# Patient Record
Sex: MALE | Race: WHITE | URBAN - METROPOLITAN AREA
[De-identification: names, ages, dates, MRNs, and addresses within clinical notes are randomized per-mention and may not be internally consistent; named-entity substitution may affect disease eponyms.]

---

## 2019-08-30 ENCOUNTER — TRANSFERRED RECORDS (OUTPATIENT)
Dept: HEALTH INFORMATION MANAGEMENT | Facility: CLINIC | Age: 79
End: 2019-08-30

## 2019-08-30 ENCOUNTER — TELEPHONE (OUTPATIENT)
Dept: OPHTHALMOLOGY | Facility: CLINIC | Age: 79
End: 2019-08-30

## 2019-08-30 ENCOUNTER — OFFICE VISIT (OUTPATIENT)
Dept: OPHTHALMOLOGY | Facility: CLINIC | Age: 79
End: 2019-08-30
Attending: OPHTHALMOLOGY
Payer: COMMERCIAL

## 2019-08-30 DIAGNOSIS — H16.012 CENTRAL CORNEAL ULCER OF LEFT EYE: ICD-10-CM

## 2019-08-30 DIAGNOSIS — B00.52 HERPES KERATITIS: ICD-10-CM

## 2019-08-30 DIAGNOSIS — H16.002 ULCER OF LEFT CORNEA: Primary | ICD-10-CM

## 2019-08-30 PROBLEM — Z02.89 PAIN MEDICATION AGREEMENT: Status: ACTIVE | Noted: 2017-09-26

## 2019-08-30 PROBLEM — Z79.899 MEDICATION MANAGEMENT: Status: ACTIVE | Noted: 2018-05-25

## 2019-08-30 PROBLEM — L82.1 SEBORRHEIC KERATOSES: Status: ACTIVE | Noted: 2017-09-26

## 2019-08-30 PROBLEM — T82.599A FAILING VASCULAR BYPASS GRAFT: Status: ACTIVE | Noted: 2018-05-30

## 2019-08-30 PROBLEM — A41.9 SEPSIS (H): Status: ACTIVE | Noted: 2019-01-29

## 2019-08-30 PROBLEM — I42.9 CARDIOMYOPATHY (H): Status: ACTIVE | Noted: 2017-09-26

## 2019-08-30 PROBLEM — J96.11 CHRONIC RESPIRATORY FAILURE WITH HYPOXIA (H): Status: ACTIVE | Noted: 2017-09-26

## 2019-08-30 PROBLEM — Q72.90: Status: ACTIVE | Noted: 2017-09-26

## 2019-08-30 PROBLEM — M25.511 CHRONIC RIGHT SHOULDER PAIN: Status: ACTIVE | Noted: 2017-11-21

## 2019-08-30 PROBLEM — M75.101 ROTATOR CUFF TEAR ARTHROPATHY OF RIGHT SHOULDER: Status: ACTIVE | Noted: 2017-11-21

## 2019-08-30 PROBLEM — K21.9 GERD (GASTROESOPHAGEAL REFLUX DISEASE): Status: ACTIVE | Noted: 2017-09-26

## 2019-08-30 PROBLEM — Z86.711 PERSONAL HISTORY OF PULMONARY EMBOLISM: Status: ACTIVE | Noted: 2017-09-26

## 2019-08-30 PROBLEM — G64 DISORDER OF PERIPHERAL NERVOUS SYSTEM: Status: ACTIVE | Noted: 2017-09-26

## 2019-08-30 PROBLEM — M72.0 DUPUYTREN'S CONTRACTURE OF BOTH HANDS: Status: ACTIVE | Noted: 2017-09-26

## 2019-08-30 PROBLEM — E53.8 COBALAMIN DEFICIENCY: Status: ACTIVE | Noted: 2017-09-26

## 2019-08-30 PROBLEM — R91.1 COIN LESION: Status: ACTIVE | Noted: 2017-09-26

## 2019-08-30 PROBLEM — I73.9 PERIPHERAL VASCULAR DISEASE (H): Status: ACTIVE | Noted: 2017-09-26

## 2019-08-30 PROBLEM — H91.90 HEARING LOSS: Status: ACTIVE | Noted: 2017-09-26

## 2019-08-30 PROBLEM — G47.00 INSOMNIA: Status: ACTIVE | Noted: 2017-09-26

## 2019-08-30 PROBLEM — R91.1 NODULE OF LOWER LOBE OF RIGHT LUNG: Status: ACTIVE | Noted: 2019-02-01

## 2019-08-30 PROBLEM — E66.9 OBESITY: Status: ACTIVE | Noted: 2017-09-26

## 2019-08-30 PROBLEM — I50.20 SYSTOLIC HEART FAILURE (H): Status: ACTIVE | Noted: 2017-09-26

## 2019-08-30 PROBLEM — Z86.718 PERSONAL HISTORY OF VENOUS THROMBOSIS AND EMBOLISM: Status: ACTIVE | Noted: 2017-09-26

## 2019-08-30 PROBLEM — L30.9 ECZEMA: Status: ACTIVE | Noted: 2017-09-26

## 2019-08-30 PROBLEM — J18.9 PNEUMONIA: Status: ACTIVE | Noted: 2019-01-28

## 2019-08-30 PROBLEM — Z79.891 CHRONIC USE OF OPIATE DRUG FOR THERAPEUTIC PURPOSE: Status: ACTIVE | Noted: 2018-05-25

## 2019-08-30 PROBLEM — R04.0 RECURRENT EPISTAXIS: Status: ACTIVE | Noted: 2018-10-04

## 2019-08-30 PROBLEM — M72.2 PLANTAR FASCIITIS: Status: ACTIVE | Noted: 2017-09-29

## 2019-08-30 PROBLEM — Z99.81 DEPENDENCE ON SUPPLEMENTAL OXYGEN: Status: ACTIVE | Noted: 2017-09-26

## 2019-08-30 PROBLEM — F41.1 ANXIETY, GENERALIZED: Status: ACTIVE | Noted: 2017-09-26

## 2019-08-30 PROBLEM — L57.8 DERMATITIS DUE TO SUN: Status: ACTIVE | Noted: 2017-09-26

## 2019-08-30 PROBLEM — M17.9 DJD (DEGENERATIVE JOINT DISEASE) OF KNEE: Status: ACTIVE | Noted: 2017-09-26

## 2019-08-30 PROBLEM — G89.29 CHRONIC RIGHT SHOULDER PAIN: Status: ACTIVE | Noted: 2017-11-21

## 2019-08-30 PROBLEM — I25.2 HISTORY OF MYOCARDIAL INFARCTION: Status: ACTIVE | Noted: 2017-09-26

## 2019-08-30 PROBLEM — R60.9 EDEMA: Status: ACTIVE | Noted: 2017-09-26

## 2019-08-30 PROBLEM — R53.1 GENERALIZED WEAKNESS: Status: ACTIVE | Noted: 2017-09-26

## 2019-08-30 PROBLEM — E78.5 HYPERLIPIDEMIA: Status: ACTIVE | Noted: 2017-09-26

## 2019-08-30 PROBLEM — Z92.29 HISTORY OF ANTICOAGULANT THERAPY: Status: ACTIVE | Noted: 2017-09-26

## 2019-08-30 PROBLEM — I25.5 ISCHEMIC CARDIOMYOPATHY: Status: ACTIVE | Noted: 2017-01-25

## 2019-08-30 PROBLEM — M12.811 ROTATOR CUFF TEAR ARTHROPATHY OF RIGHT SHOULDER: Status: ACTIVE | Noted: 2017-11-21

## 2019-08-30 LAB
GRAM STN SPEC: ABNORMAL
KOH PREP SPEC: NORMAL
SPECIMEN SOURCE: ABNORMAL
SPECIMEN SOURCE: NORMAL

## 2019-08-30 PROCEDURE — 87102 FUNGUS ISOLATION CULTURE: CPT | Performed by: OPHTHALMOLOGY

## 2019-08-30 PROCEDURE — 87210 SMEAR WET MOUNT SALINE/INK: CPT | Performed by: OPHTHALMOLOGY

## 2019-08-30 PROCEDURE — 87205 SMEAR GRAM STAIN: CPT | Performed by: OPHTHALMOLOGY

## 2019-08-30 PROCEDURE — 87070 CULTURE OTHR SPECIMN AEROBIC: CPT | Performed by: OPHTHALMOLOGY

## 2019-08-30 PROCEDURE — G0463 HOSPITAL OUTPT CLINIC VISIT: HCPCS | Mod: ZF

## 2019-08-30 PROCEDURE — 87077 CULTURE AEROBIC IDENTIFY: CPT | Performed by: OPHTHALMOLOGY

## 2019-08-30 PROCEDURE — 87076 CULTURE ANAEROBE IDENT EACH: CPT | Performed by: OPHTHALMOLOGY

## 2019-08-30 PROCEDURE — 87186 SC STD MICRODIL/AGAR DIL: CPT | Performed by: OPHTHALMOLOGY

## 2019-08-30 PROCEDURE — 87075 CULTR BACTERIA EXCEPT BLOOD: CPT | Performed by: OPHTHALMOLOGY

## 2019-08-30 RX ORDER — OMEGA-3-ACID ETHYL ESTERS 1 G/1
1 CAPSULE, LIQUID FILLED ORAL DAILY
COMMUNITY
Start: 2018-09-11

## 2019-08-30 RX ORDER — SENNOSIDES 8.6 MG
1300 CAPSULE ORAL DAILY PRN
COMMUNITY
Start: 2018-05-22

## 2019-08-30 RX ORDER — ASCORBIC ACID 500 MG
500 TABLET ORAL DAILY
COMMUNITY

## 2019-08-30 RX ORDER — PRAMIPEXOLE DIHYDROCHLORIDE 0.12 MG/1
0.12 TABLET ORAL 2 TIMES DAILY
COMMUNITY
Start: 2018-10-11

## 2019-08-30 RX ORDER — DOXYCYCLINE HYCLATE 100 MG
100 TABLET ORAL 2 TIMES DAILY
Qty: 60 TABLET | Refills: 1 | Status: SHIPPED | OUTPATIENT
Start: 2019-08-30

## 2019-08-30 RX ORDER — VALACYCLOVIR HYDROCHLORIDE 1 G/1
1000 TABLET, FILM COATED ORAL 3 TIMES DAILY
COMMUNITY
Start: 2019-08-19

## 2019-08-30 RX ORDER — ZOLPIDEM TARTRATE 10 MG/1
10 TABLET ORAL AT BEDTIME
COMMUNITY
Start: 2019-04-23

## 2019-08-30 RX ORDER — WARFARIN SODIUM 10 MG/1
5 TABLET ORAL DAILY
COMMUNITY
Start: 2019-07-30

## 2019-08-30 RX ORDER — CARVEDILOL 3.12 MG/1
3.12 TABLET ORAL DAILY
COMMUNITY
Start: 2019-07-18

## 2019-08-30 RX ORDER — ALBUTEROL SULFATE 0.83 MG/ML
2.5 SOLUTION RESPIRATORY (INHALATION) DAILY
COMMUNITY

## 2019-08-30 RX ORDER — LORAZEPAM 1 MG/1
1 TABLET ORAL 3 TIMES DAILY
COMMUNITY
Start: 2019-06-25

## 2019-08-30 RX ORDER — MOXIFLOXACIN 5 MG/ML
1 SOLUTION/ DROPS OPHTHALMIC
COMMUNITY
Start: 2019-08-29 | End: 2019-09-05

## 2019-08-30 RX ORDER — EPINEPHRINE 0.3 MG/.3ML
0.3 INJECTION SUBCUTANEOUS PRN
COMMUNITY
Start: 2018-08-14

## 2019-08-30 RX ORDER — LANOLIN ALCOHOL/MO/W.PET/CERES
1000 CREAM (GRAM) TOPICAL DAILY
COMMUNITY

## 2019-08-30 RX ORDER — PRIMIDONE 250 MG/1
125 TABLET ORAL 3 TIMES DAILY
COMMUNITY
Start: 2018-10-11 | End: 2019-09-23

## 2019-08-30 RX ORDER — LEVOFLOXACIN 750 MG/1
750 TABLET, FILM COATED ORAL DAILY
COMMUNITY
Start: 2019-01-31

## 2019-08-30 RX ORDER — ARFORMOTEROL TARTRATE 15 UG/2ML
15 SOLUTION RESPIRATORY (INHALATION) 2 TIMES DAILY
COMMUNITY

## 2019-08-30 RX ORDER — ALBUTEROL SULFATE 90 UG/1
1 AEROSOL, METERED RESPIRATORY (INHALATION) DAILY PRN
COMMUNITY
Start: 2019-01-07

## 2019-08-30 RX ORDER — NITROGLYCERIN 0.4 MG/1
0.4 TABLET SUBLINGUAL PRN
COMMUNITY
Start: 2015-05-29

## 2019-08-30 RX ORDER — MIRTAZAPINE 45 MG/1
45 TABLET, FILM COATED ORAL DAILY
COMMUNITY
Start: 2019-08-20

## 2019-08-30 ASSESSMENT — TONOMETRY
IOP_METHOD: ICARE
OS_IOP_MMHG: 7
OD_IOP_MMHG: 11

## 2019-08-30 ASSESSMENT — VISUAL ACUITY
OD_SC: 20/20
OS_SC: 20/600
OS_PH_SC: 20/300
OD_SC+: -1
METHOD: SNELLEN - LINEAR

## 2019-08-30 ASSESSMENT — CONF VISUAL FIELD
OD_INFERIOR_TEMPORAL_RESTRICTION: 3
OS_NORMAL: 1
OD_INFERIOR_NASAL_RESTRICTION: 3

## 2019-08-30 ASSESSMENT — EXTERNAL EXAM - LEFT EYE: OS_EXAM: NORMAL

## 2019-08-30 ASSESSMENT — EXTERNAL EXAM - RIGHT EYE: OD_EXAM: NORMAL

## 2019-08-30 ASSESSMENT — SLIT LAMP EXAM - LIDS
COMMENTS: 1+ BLEPHARITIS
COMMENTS: 1+ BLEPHARITIS

## 2019-08-30 NOTE — NURSING NOTE
Chief Complaints and History of Present Illnesses   Patient presents with     Corneal Ulcer Evaluation     Chief Complaint(s) and History of Present Illness(es)     Corneal Ulcer Evaluation     Laterality: left eye    Associated symptoms: eye pain, redness, tearing, photophobia and headache    Treatments tried: eye drops    Pain scale: 7/10              Comments     Referred by Dr Sanchez for K ulcer/ HSV OS    Vision of the LE is stable, c/o constant blurriness + ocular pain (slightly managed with analgesics: tylenol)  Reports strict compliancy with tx plan.     Ocular meds: Vigamox q1hr while awake/q2hr while sleeping     Neema Davies COT 11:47 AM August 30, 2019

## 2019-08-30 NOTE — TELEPHONE ENCOUNTER
[facility] pharmacy compounds same day eye drops if needed after review with compounding pharmacy and [facility] pharmtato    [facility] pharmacy aware of prescriptions and will work on for same day dispensing today    Pt aware to go to [facility] pharmacy  Tramaine Ackerman RN RN 3:19 PM 08/30/19          [facility] Call Center    Phone Message    May a detailed message be left on voicemail: yes    Reason for Call: Medication Question or concern regarding medication   Prescription Clarification  Name of Medication: EYE DROP (SPECIALLY MADE) Pt unable to verify name of rx.   Prescribing Provider: ANDREA   Pharmacy: [facility] [place], [place] - [facility] [address]   What on the order needs clarification? Pt just got out of an appt with Andrea. They're at the pharmacy RIGHT NOW trying to  eye drops. Pharmacy is telling them that they won't have it ready until Wednesday. Pt is upset. Please call pt back.     Action Taken: Message routed to:  [facility]: eye and [facility] ([facility]): eye

## 2019-08-30 NOTE — TELEPHONE ENCOUNTER
Left eye gram stain today    Lab reporting few gram negative rods and many white blood cells, predominately PMN's    Pt on fortified antibiotics     Note to Dr. Collazo (on call also) for review  Tramaine Ackerman RN RN 4:41 PM 08/30/19          [facility] Call Center    Phone Message    May a detailed message be left on voicemail: yes    Reason for Call: Other: joceline from the lab is calling to report critical lab results, i called the back line, unable to reach anybody, please call joceline moise at [phone] thanks     Action Taken: Message routed to:  [facility] ([facility] eye

## 2019-08-30 NOTE — TELEPHONE ENCOUNTER
Lab calling   KOH/gram stain/and parasite ordered  Only able to get 2 of 3     Reviewed with Dr. Ochoa and note    Ok to cancel parasite    Note to Dr. Vale Ackerman, RN RN 4:11 PM 08/30/19

## 2019-08-30 NOTE — PROGRESS NOTES
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 Initial episode reportedly 6 years ago. Had been stable/quiescent for a few years up until Jan/Feb. Wife reports that since that time, has had almost weekly episodes of inflammation, eye redness, and pain. Has been following with Dr Sanchez at [facility]. Had repeat flare of keratitis beginning 6/5/19 which has progressed to corneal ulcer/epi defect with worsening infiltrate the past week. Initially managed with oral Valtrex (starting ~6 months ago) and Zirgan gel (had been using for ~3 months per patient/wife, stopped yesterday). On 8/29/19, epi defect was noted and he was started on vigamox q1 hour while awake, q2 hour overnight. Left eye feels worse today compared to yesterday but he reports improving vision subjectively.    On 8/26 appointment with Dr Sanchez, VA was 20/150, 20/600 today (ph 20/300). Ocular history otherwise notable for Cataract extraction/IOL both eyes.     No history of shingles vaccine. Not a contact lens wearer. Not diabetic, no history of autoimmune conditions.     POHx:  Last eye exam: yesterday at [facility] with Idris Sanchez  Prior eye surgery/laser: Cataract each eye   CTL wearer: No    Gtts:  Vigamox q1 hour left eye while awake, q2 hour overnight  Zirgan gel (discontinued yesterday)  Artificial tears q1-2 hours  Valtrex orally 1gm three times a day     All:  Niacin  Ketorolac    A/P:  1. Chronic herpetic keratitis, left eye  2. Central corneal ulcer, left eye  -herpetic keratitis left eye chronically for 6 years, progressive since Jan/Feb of this year per patient, and now with epi defect/ulceration x 1 week with worsening K infiltrate per Dr Sanchez  -stop Vigamox  -start fortified vancomycin and tobramycin drops q1 hour while awake, q2-3 hour overnight  -start PFATs q30-40 minutes, or more  freq if desired  -continue Valtrex 1gm three times a day   -start doxycyline 100mg twice a day - prescription sent  -Corneal scraping/culture sent today for gram stain/aerobic/anaerobic/fungal/KOH        Follow up:  Prefer follow up here at the [place] but patient/wife refuse, say travel is too difficult. Will follow up with Dr Sanchez tomorrow AM.     Manuel Grant MD  PGY5, Cornea Fellow  [facility]

## 2019-09-02 LAB
PARASITE SPEC INSPECT: NORMAL
SPECIMEN SOURCE: NORMAL

## 2019-09-03 ENCOUNTER — TELEPHONE (OUTPATIENT)
Dept: OPHTHALMOLOGY | Facility: CLINIC | Age: 79
End: 2019-09-03

## 2019-09-03 NOTE — TELEPHONE ENCOUNTER
Microbiology lab left message last Saturday on triage line    Light growth gram positive cocci in clusters      Note to Dr. Harmon/Vale for review of lab results, if not previously notified over weekend    Tramaine Ackerman RN RN 9:57 AM 09/03/19

## 2019-09-04 LAB
BACTERIA SPEC CULT: ABNORMAL
SPECIMEN SOURCE: ABNORMAL

## 2019-09-07 LAB
BACTERIA SPEC CULT: ABNORMAL
BACTERIA SPEC CULT: ABNORMAL
Lab: ABNORMAL
SPECIMEN SOURCE: ABNORMAL

## 2019-09-27 LAB
FUNGUS SPEC CULT: NORMAL
SPECIMEN SOURCE: NORMAL